# Patient Record
Sex: MALE | Race: WHITE | URBAN - METROPOLITAN AREA
[De-identification: names, ages, dates, MRNs, and addresses within clinical notes are randomized per-mention and may not be internally consistent; named-entity substitution may affect disease eponyms.]

---

## 2022-11-22 ENCOUNTER — OFFICE VISIT (OUTPATIENT)
Dept: CARDIOLOGY CLINIC | Age: 62
End: 2022-11-22
Payer: MEDICARE

## 2022-11-22 VITALS
OXYGEN SATURATION: 98 % | HEART RATE: 77 BPM | DIASTOLIC BLOOD PRESSURE: 80 MMHG | BODY MASS INDEX: 33.52 KG/M2 | SYSTOLIC BLOOD PRESSURE: 136 MMHG | WEIGHT: 214 LBS | TEMPERATURE: 97.6 F

## 2022-11-22 DIAGNOSIS — Z01.818 PRE-OP TESTING: Primary | ICD-10-CM

## 2022-11-22 DIAGNOSIS — I25.10 ATHEROSCLEROSIS OF NATIVE CORONARY ARTERY OF NATIVE HEART WITHOUT ANGINA PECTORIS: ICD-10-CM

## 2022-11-22 PROCEDURE — G8510 SCR DEP NEG, NO PLAN REQD: HCPCS | Performed by: INTERNAL MEDICINE

## 2022-11-22 PROCEDURE — 99204 OFFICE O/P NEW MOD 45 MIN: CPT | Performed by: INTERNAL MEDICINE

## 2022-11-22 PROCEDURE — G8419 CALC BMI OUT NRM PARAM NOF/U: HCPCS | Performed by: INTERNAL MEDICINE

## 2022-11-22 PROCEDURE — 93000 ELECTROCARDIOGRAM COMPLETE: CPT | Performed by: INTERNAL MEDICINE

## 2022-11-22 PROCEDURE — 3017F COLORECTAL CA SCREEN DOC REV: CPT | Performed by: INTERNAL MEDICINE

## 2022-11-22 PROCEDURE — G8428 CUR MEDS NOT DOCUMENT: HCPCS | Performed by: INTERNAL MEDICINE

## 2022-11-22 RX ORDER — OXCARBAZEPINE 300 MG/1
TABLET, FILM COATED ORAL 2 TIMES DAILY
COMMUNITY

## 2022-11-22 RX ORDER — LISINOPRIL 20 MG/1
TABLET ORAL DAILY
COMMUNITY

## 2022-11-22 RX ORDER — ASPIRIN 81 MG/1
81 TABLET ORAL DAILY
Qty: 90 TABLET | Refills: 1 | Status: SHIPPED | OUTPATIENT
Start: 2022-11-22

## 2022-11-22 RX ORDER — GABAPENTIN 300 MG/1
300 CAPSULE ORAL 3 TIMES DAILY
COMMUNITY

## 2022-11-22 RX ORDER — ATORVASTATIN CALCIUM 20 MG/1
20 TABLET, FILM COATED ORAL DAILY
COMMUNITY

## 2022-11-22 NOTE — PROGRESS NOTES
[facility]    Mr. Albania Kilgore is 13-year-old male with a history of CAD, coronary stent, hypertension, hyperlipidemia, trigeminal neuralgia    Patient is here today for cardiac evaluation. He denies prior history of CHF  Patient had myocardial infarction in 2004 requiring 2 coronary stent. According to patient one stent was in RCA and other stent was in circumflex as far as he can remember. We do not have this record at this time. Patient was doing relatively well up until 2017 when he had some atypical chest discomfort. According to patient he was in [place] for work and had a cardiac catheterization there. He was told that his RCA stent was occluded and he had a collateral circulation from the left. Patient is scheduled to have surgery for his trigeminal neuralgia causing unbearable pain at times  Patient is functionally active. He is able to perform activity living without limitation. He can climb 3 or 4 flight of stairs easily without any limitation. He walks up to 7 miles a day without any symptoms to suggest angina or heart failure. He denies PND or lower edema swelling. He uses 1 pillow at night. No lower extremity swelling. No palpitation, presyncope or syncope  Denies any nausea, vomiting, abdominal pain, fever, chills, sputum production. No hematuria or other bleeding complaints    Past Medical History:   Diagnosis Date    Arthritis     BPH with obstruction/lower urinary tract symptoms     Coronary artery disease     S/P RCA ELIZABETH X 2 in 2004. diabetes     Dyslipidemia     Erectile dysfunction     Hypertension     Insomnia     Slow urinary stream     Trigeminal neuralgia        Review of Systems:  Cardiac symptoms as noted above in HPI. All others negative. Current Outpatient Medications   Medication Sig    atorvastatin (LIPITOR) 20 mg tablet Take 20 mg by mouth daily.     lisinopriL (PRINIVIL, ZESTRIL) 20 mg tablet Take  by mouth daily. OXcarbazepine (TrileptaL) 300 mg tablet Take  by mouth two (2) times a day.    gabapentin (NEURONTIN) 300 mg capsule Take 300 mg by mouth three (3) times daily. metFORMIN (GLUCOPHAGE) 500 mg tablet Take  by mouth two (2) times daily (with meals). avanafil (STENDRA) 100 mg tablet Take 1 tablet by mouth as needed for Other. cholecalciferol (VITAMIN D3) 400 unit tab tablet Take  by mouth daily. No current facility-administered medications for this visit. No past surgical history on file. Allergies and Sensitivities:  Allergies   Allergen Reactions    Compazine [Prochlorperazine] Other (comments)    Gemfibrozil Other (comments)    Metoclopramide Other (comments)     akathesia    Niacin Other (comments)    Statins-Hmg-Coa Reductase Inhibitors Other (comments)     myalgias       Family History:  Family History   Problem Relation Age of Onset    Hypertension Other        Social History:  Social History     Tobacco Use    Smoking status: Never   Substance Use Topics    Alcohol use: Yes     He  reports that he has never smoked. He does not have any smokeless tobacco history on file. He  reports current alcohol use. Physical Exam:  BP Readings from Last 3 Encounters:   11/22/22 136/80   06/16/17 120/82   03/10/15 122/80         Pulse Readings from Last 3 Encounters:   11/22/22 77   03/10/15 79          Wt Readings from Last 3 Encounters:   11/22/22 97.1 kg (214 lb)   06/16/17 105.2 kg (232 lb)   03/10/15 105.5 kg (232 lb 9.6 oz)       Constitutional: Oriented to person, place, and time. HENT: Head: Normocephalic and atraumatic. Eyes: Conjunctivae and extraocular motions are normal.   Neck: No JVD present. Carotid bruit is not appreciated. Cardiovascular: Regular rhythm. No murmur, gallop or rubs appreciated  Lung: Breath sounds normal. No respiratory distress. No ronchi or rales appreciated  Abdominal: No tenderness. No rebound and no guarding.    Musculoskeletal: There is no lower extremity edema. No cynosis  Lymphadenopathy:  No cervical or supraclavicular adenopathy appriciated. Neurological: No gross motor deficit noted. Skin: No visible skin rash noted. No Ear discharge noted  Psychiatric: Normal mood and affect. LABS:   @No results found for: WBC, WBCLT, HGBPOC, HGB, HGBP, HCTPOC, HCT, PHCT, RBCH, PLT, MCV, HGBEXT, HCTEXT, PLTEXT, HGBEXT, HCTEXT, PLTEXT  Lab Results   Component Value Date/Time    Sodium 138 01/06/2010 02:14 PM    Potassium 4.4 01/06/2010 02:14 PM    Chloride 100 01/06/2010 02:14 PM    CO2 29 01/06/2010 02:14 PM    Glucose 91 01/06/2010 02:14 PM    BUN 15 01/06/2010 02:14 PM    Creatinine 1.2 01/06/2010 02:14 PM     No flowsheet data found. Lab Results   Component Value Date/Time    ALT (SGPT) 63 01/06/2010 02:14 PM     No results found for: HBA1C, BOM6XSNS, EKR8SMNM, PKB8KYPS  No results found for: TSH, TSH2, TSH3, TSHP, TSHEXT, TSHEXT    EKG  11/22/2022: Sinus rhythm at 79 bpm.  Normal NE and QRS interval.  No ST changes of ischemia. STRESS TEST (EST, PHARM, NUC, ECHO etc)    CATHETERIZATION    IMPRESSION & PLAN:  Mr. Elroy Quinn is 60-year male    CAD:  According to patient, he had a myocardial infarction in 2004 requiring RCA and LCx stent. He had another cardiac catheterization for atypical chest pain in 2017 and at the time he was found to have occluded RCA stent with left-to-right collateral.  I do not have any of this record however patient appears to be very knowledgeable about his health and cardiac history  Denies any symptom that is concerning for angina or heart failure. He denies any chest pain or chest tightness. He is taking aspirin and atorvastatin    Hypertension: Currently on lisinopril 20 mg daily. /80. Continue same  Echo ordered to rule out hypertensive cardiovascular heart disease especially with a history of hypertension and CAD    Hyperlipidemia: Currently on atorvastatin 20 mg daily.   Continue same    Trigeminal neuralgia: Following neurology and neurosurgery team.  Anticipating surgery. Currently on Trileptal and gabapentin. Patient is scheduled to have surgery for trigeminal neuralgia. Patient has no history of CAD and diabetes but denies history of CHF, CKD and CVA  Currently patient has no symptoms to suggest angina or heart failure. He has no unstable coronary symptoms. No evidence of fluid overload  Patient walks 7 miles a day on a daily basis without any limitation. His functional status based on history is clearly more than 4 METS  In absence of any cardiac symptoms, I do not believe he needs any further cardiac work-up. He is acceptable and at intermediate risk for any cardiac complication. This was discussed with patient in great detail. He verbalized understand the plan    This plan was discussed with patient who is in agreement. Thank you for allowing me to participate in patient care. Please feel free to call me if you have any question or concern. Martha Laboy MD  Please note: This document has been produced using voice recognition software. Unrecognized errors in transcription may be present.

## 2022-11-22 NOTE — PATIENT INSTRUCTIONS
Testing   Echo  **call office 3-5 days after testing is completed for results**     New Medication/Medication Changes  Start Aspirin 81 mg take 1 tab daily     **please allow 24-48 hrs for medication to be escribed to pharmacy** If you need any refills on medications please contact your pharmacy so that the request can be escribed to the provider for review.

## 2022-11-22 NOTE — LETTER
11/22/2022    Patient: Annie Gonzalez   YOB: 1960   Date of Visit: 11/22/2022     Donato Chin MD  [address] [facility]  [address]  [facility] [address]  Via Fax: [phone]    Dear Donato Chin MD,      Thank you for referring Mr. Ariadna Grimes to [address] [place] [address] for evaluation. My notes for this consultation are attached. If you have questions, please do not hesitate to call me. I look forward to following your patient along with you.       Sincerely,    Jason Auguste MD

## 2022-11-22 NOTE — PROGRESS NOTES
Identified pt with two pt identifiers(name and ). Reviewed record in preparation for visit and have obtained necessary documentation. Steph Chandra presents today for   Chief Complaint   Patient presents with    New Patient    Surgical Clearance       Pt denied DIZZINESS, SOB, CHEST PAIN/ PRESSURE, FATIGUE/WEAKNESS, HEADACHES, SWELLING. Steph Chandra preferred language for health care discussion is english/other. Personal Protective Equipment:   Personal Protective Equipment was used including: mask-surgical and hands-gloves. Patient was placed on no precaution(s). Patient was masked. Precautions:   Patient currently on None  Patient currently roomed with door closed. Is someone accompanying this pt? no    Is the patient using any DME equipment during OV? no    Depression Screening:  No flowsheet data found. Learning Assessment:  No flowsheet data found. Abuse Screening:  completed    Fall Risk  Comnpleted    Pt currently taking Anticoagulant therapy? no  Pt currently taking Antiplatelet therapy? yes    Coordination of Care:  1. Have you been to the ER, urgent care clinic since your last visit? Hospitalized since your last visit? no    2. Have you seen or consulted any other health care providers outside of the [address] [place] [address] since your last visit? Include any pap smears or colon screening. Dr Justin Au      Please see Red banners under Allergies and Med Rec to remove outside inquires. All correct information has been verified with patient and added to chart.      Medication's patient's would liked removed has been marked not taking to be removed per Verbal order and read back per Francisca Sutherland MD

## 2022-12-21 ENCOUNTER — TELEPHONE (OUTPATIENT)
Dept: CARDIOLOGY CLINIC | Age: 62
End: 2022-12-21

## 2023-01-09 ENCOUNTER — TELEPHONE (OUTPATIENT)
Dept: CARDIOLOGY CLINIC | Age: 63
End: 2023-01-09

## 2023-01-09 NOTE — TELEPHONE ENCOUNTER
Patient currently at the hospital for complication with neuro surgery and says that his BP is high 198/95.  Informed patient that unfortunately Pramod Chappell would not be able to give any medical recommendation due to him being at [facility]

## 2023-05-23 ENCOUNTER — OFFICE VISIT (OUTPATIENT)
Age: 63
End: 2023-05-23
Payer: COMMERCIAL

## 2023-05-23 VITALS
BODY MASS INDEX: 33.83 KG/M2 | HEART RATE: 68 BPM | DIASTOLIC BLOOD PRESSURE: 91 MMHG | WEIGHT: 216 LBS | OXYGEN SATURATION: 99 % | SYSTOLIC BLOOD PRESSURE: 155 MMHG

## 2023-05-23 DIAGNOSIS — E78.00 PURE HYPERCHOLESTEROLEMIA: ICD-10-CM

## 2023-05-23 DIAGNOSIS — I25.83 CORONARY ARTERY DISEASE DUE TO LIPID RICH PLAQUE: ICD-10-CM

## 2023-05-23 DIAGNOSIS — I10 ESSENTIAL HYPERTENSION WITH GOAL BLOOD PRESSURE LESS THAN 140/90: ICD-10-CM

## 2023-05-23 DIAGNOSIS — I48.0 PAF (PAROXYSMAL ATRIAL FIBRILLATION) (HCC): Primary | ICD-10-CM

## 2023-05-23 DIAGNOSIS — I25.10 CORONARY ARTERY DISEASE DUE TO LIPID RICH PLAQUE: ICD-10-CM

## 2023-05-23 PROCEDURE — 1123F ACP DISCUSS/DSCN MKR DOCD: CPT | Performed by: INTERNAL MEDICINE

## 2023-05-23 PROCEDURE — 99213 OFFICE O/P EST LOW 20 MIN: CPT | Performed by: INTERNAL MEDICINE

## 2023-05-23 PROCEDURE — 3077F SYST BP >= 140 MM HG: CPT | Performed by: INTERNAL MEDICINE

## 2023-05-23 PROCEDURE — 3078F DIAST BP <80 MM HG: CPT | Performed by: INTERNAL MEDICINE

## 2023-05-23 RX ORDER — LISINOPRIL AND HYDROCHLOROTHIAZIDE 20; 12.5 MG/1; MG/1
1 TABLET ORAL DAILY
COMMUNITY
Start: 2023-04-11

## 2023-05-23 NOTE — PROGRESS NOTES
[facility]    Julia Ledesma is 70 y.o. male with a history of CAD, coronary stent, hypertension, hyperlipidemia, trigeminal neuralgia      Patient is here today for cardiac evaluation. He denies prior history of CHF  Patient has known history of CAD status post coronary stent in 2004. Currently patient denies any symptoms to suggest angina or heart failure. He is able to perform active daily living though no limitation. He walks almost 7 miles a day without any chest pain or chest tightness. He feels much better after having craniotomy for decompression surgery for trigeminal neuralgia   Denies any nausea, vomiting, abdominal pain, fever, chills, sputum production. No hematuria or other bleeding complaints       Past Medical History:   Diagnosis Date    Arthritis     Benign hypertensive heart disease without heart failure     BPH with obstruction/lower urinary tract symptoms     Coronary atherosclerosis of native coronary artery     S/P RCA LATONYA X 2 in 2004. Dyslipidemia     Erectile dysfunction     Insomnia     Prediabetes     Slow urinary stream     Trigeminal neuralgia        Review of Systems:  Cardiac symptoms as noted above in HPI. All others negative. Denies fatigue, malaise, skin rash, joint pain, blurring vision, photophobia, neck pain, hemoptysis, chronic cough, nausea, vomiting, hematuria, burning micturition, BRBPR, chronic headaches. Current Outpatient Medications   Medication Sig    aspirin 81 MG EC tablet Take 81 mg by mouth daily    atorvastatin (LIPITOR) 20 MG tablet Take 20 mg by mouth daily    Avanafil 100 MG TABS Take 100 mg by mouth as needed    vitamin D3 (CHOLECALCIFEROL) 10 MCG (400 UNIT) TABS tablet Take by mouth daily    gabapentin (NEURONTIN) 300 MG capsule Take 300 mg by mouth 3 times daily.     lisinopril (PRINIVIL;ZESTRIL) 20 MG tablet Take by mouth daily    metFORMIN (GLUCOPHAGE) 500 MG tablet Take by

## 2023-05-23 NOTE — PROGRESS NOTES
Identified pt with two pt identifiers(name and ). Reviewed record in preparation for visit and have obtained necessary documentation. Magui Contreras presents today for   Chief Complaint   Patient presents with    Follow-up     Post echo        Pt  denied  DIZZINESS, SOB, CHEST PAIN/ PRESSURE, FATIGUE/WEAKNESS, HEADACHES, SWELLING. Magui Contreras preferred language for health care discussion is english/other. Personal Protective Equipment:   Personal Protective Equipment was used including: mask-surgical and hands-gloves. Patient was placed on no precaution(s). Patient was masked. Precautions:   Patient currently on None  Patient currently roomed with door closed. Is someone accompanying this pt? no    Is the patient using any DME equipment during [address]? no    Depression Screening:  PHQ-9 Questionaire 2022   Little interest or pleasure in doing things 0   Feeling down, depressed, or hopeless 0   PHQ-9 Total Score 0        Learning Assessment:  No question data found. Abuse Screening: AMB Abuse Screening 2023   Do you ever feel afraid of your partner? N   Are you in a relationship with someone who physically or mentally threatens you? N   Is it safe for you to go home? Y          Fall Risk  Fall Risk 2023   2 or more falls in past year? no   Fall with injury in past year? no         Pt currently taking Anticoagulant therapy? No   Pt currently taking Antiplatelet therapy? yes    Coordination of Care:  1. Have you been to the ER, urgent care clinic since your last visit? Hospitalized since your last visit? [facility]     2. Have you seen or consulted any other health care providers outside of the [address] [place] [address] since your last visit? Include any pap smears or colon screening. no      Please see Red banners under Allergies and Med Rec to remove outside inquires. All correct information has been verified with patient and added to chart.      Medication's patient's

## 2023-06-20 NOTE — PATIENT INSTRUCTIONS
----- Message from Xu Ho sent at 6/20/2023 11:18 AM CDT -----  Amlodipine B/P MED TO [facility] PT [phone]     Please call in January 2024 to schedule your visit for May for your follow up.